# Patient Record
Sex: MALE | Race: WHITE | NOT HISPANIC OR LATINO | URBAN - METROPOLITAN AREA
[De-identification: names, ages, dates, MRNs, and addresses within clinical notes are randomized per-mention and may not be internally consistent; named-entity substitution may affect disease eponyms.]

---

## 2022-10-28 ENCOUNTER — OFFICE VISIT (OUTPATIENT)
Dept: URBAN - METROPOLITAN AREA CLINIC 118 | Facility: CLINIC | Age: 72
End: 2022-10-28
Payer: COMMERCIAL

## 2022-10-28 ENCOUNTER — LAB OUTSIDE AN ENCOUNTER (OUTPATIENT)
Dept: URBAN - METROPOLITAN AREA CLINIC 118 | Facility: CLINIC | Age: 72
End: 2022-10-28

## 2022-10-28 VITALS
HEART RATE: 67 BPM | TEMPERATURE: 98.1 F | DIASTOLIC BLOOD PRESSURE: 82 MMHG | SYSTOLIC BLOOD PRESSURE: 162 MMHG | HEIGHT: 70 IN | WEIGHT: 193 LBS | BODY MASS INDEX: 27.63 KG/M2

## 2022-10-28 DIAGNOSIS — M54.9 RIGHT-SIDED BACK PAIN, UNSPECIFIED BACK LOCATION, UNSPECIFIED CHRONICITY: ICD-10-CM

## 2022-10-28 DIAGNOSIS — R63.4 WEIGHT LOSS: ICD-10-CM

## 2022-10-28 DIAGNOSIS — Z12.11 SCREENING FOR COLON CANCER: ICD-10-CM

## 2022-10-28 PROCEDURE — 99204 OFFICE O/P NEW MOD 45 MIN: CPT | Performed by: INTERNAL MEDICINE

## 2022-10-28 NOTE — PHYSICAL EXAM PSYCH:
normal mood with appropriate affect
Pupils equal, round and reactive to light, Extra-ocular movement intact, eyes are clear b/l

## 2022-10-28 NOTE — HPI-TODAY'S VISIT:
Mr. Garcia is a 71 y/o WM who presents today for colonoscopy consultation.  His last colonoscopy was in 2015 with polyps removed per pt. He reports family history of colon cancer in his mother.  Pt reports occasional right sided back pain after sleeping on his right side. It was occur 2-3 days a week and typically goes away on its own. He denies any changes in bowel habits. He denies GI bleeding or NV. He reports his weight has been fluctuating recently depending on how much he eats or his daily activities. Reports his lowest weight was 182, now 193 today. He has hx of reflux, currently controlled for the most part on PPI. He had EGD with dilation in 2017.

## 2022-10-31 ENCOUNTER — TELEPHONE ENCOUNTER (OUTPATIENT)
Dept: URBAN - METROPOLITAN AREA CLINIC 118 | Facility: CLINIC | Age: 72
End: 2022-10-31

## 2022-11-02 ENCOUNTER — WEB ENCOUNTER (OUTPATIENT)
Dept: URBAN - METROPOLITAN AREA SURGERY CENTER 23 | Facility: SURGERY CENTER | Age: 72
End: 2022-11-02

## 2022-11-03 ENCOUNTER — OFFICE VISIT (OUTPATIENT)
Dept: URBAN - METROPOLITAN AREA SURGERY CENTER 23 | Facility: SURGERY CENTER | Age: 72
End: 2022-11-03
Payer: COMMERCIAL

## 2022-11-03 DIAGNOSIS — Z86.010 ADENOMAS PERSONAL HISTORY OF COLONIC POLYPS: ICD-10-CM

## 2022-11-03 PROCEDURE — G8907 PT DOC NO EVENTS ON DISCHARG: HCPCS | Performed by: INTERNAL MEDICINE

## 2022-11-03 PROCEDURE — G0105 COLORECTAL SCRN; HI RISK IND: HCPCS | Performed by: INTERNAL MEDICINE

## 2023-02-21 ENCOUNTER — OFFICE VISIT (OUTPATIENT)
Dept: URBAN - METROPOLITAN AREA CLINIC 118 | Facility: CLINIC | Age: 73
End: 2023-02-21
Payer: COMMERCIAL

## 2023-02-21 VITALS
SYSTOLIC BLOOD PRESSURE: 132 MMHG | HEIGHT: 70 IN | HEART RATE: 69 BPM | TEMPERATURE: 98.1 F | DIASTOLIC BLOOD PRESSURE: 73 MMHG | BODY MASS INDEX: 27.92 KG/M2 | WEIGHT: 195 LBS

## 2023-02-21 DIAGNOSIS — R14.3 FLATULENCE: ICD-10-CM

## 2023-02-21 DIAGNOSIS — R19.4 CHANGE IN BOWEL HABITS: ICD-10-CM

## 2023-02-21 PROCEDURE — 99213 OFFICE O/P EST LOW 20 MIN: CPT

## 2023-02-21 NOTE — HPI-TODAY'S VISIT:
Mr. Garcia is a 74 y/o WM who presents today for evaluation of change in bowel habits.  Patient currently having 1-2 BM/ day, but reports more urgency after eating and previously was only having 1 BM per day. Reports that about once per month he will have an "accident" where he has an episode of diarrhea.  He reports gas symptoms daily, not dependent on what he eats.  Currently on Pantoprazole daily which controls reflux/ heartburn. Hx of dysphagia, s/p dilation in 2019.  Denies any GI bleeding, unintentional weight loss, NV or abdominal pain. Last colonoscopy was 11/3/22 which showed diverticulosis and internal hemorrhoids. Recommended to repeat in 5 years due to hx of polyps.

## 2023-03-01 LAB
FECAL FAT, QUALITATIVE: (no result)
HELICOBACTER PYLORI AG, EIA, STOOL: (no result)

## 2023-03-02 ENCOUNTER — TELEPHONE ENCOUNTER (OUTPATIENT)
Dept: URBAN - METROPOLITAN AREA CLINIC 118 | Facility: CLINIC | Age: 73
End: 2023-03-02

## 2023-03-05 ENCOUNTER — WEB ENCOUNTER (OUTPATIENT)
Dept: URBAN - METROPOLITAN AREA CLINIC 118 | Facility: CLINIC | Age: 73
End: 2023-03-05

## 2023-03-14 ENCOUNTER — WEB ENCOUNTER (OUTPATIENT)
Dept: URBAN - METROPOLITAN AREA CLINIC 118 | Facility: CLINIC | Age: 73
End: 2023-03-14

## 2023-03-23 ENCOUNTER — TELEPHONE ENCOUNTER (OUTPATIENT)
Dept: URBAN - METROPOLITAN AREA CLINIC 118 | Facility: CLINIC | Age: 73
End: 2023-03-23

## 2023-03-23 RX ORDER — PANCRELIPASE 36000; 180000; 114000 [USP'U]/1; [USP'U]/1; [USP'U]/1
1-2 CAPSULES EACH DAY WITH EVERY MEAL AND 1 WITH SNACKS CAPSULE, DELAYED RELEASE PELLETS ORAL
Qty: 280 | Refills: 3 | OUTPATIENT
Start: 2023-03-24 | End: 2023-07-22

## 2023-05-17 ENCOUNTER — TELEPHONE ENCOUNTER (OUTPATIENT)
Dept: URBAN - METROPOLITAN AREA CLINIC 118 | Facility: CLINIC | Age: 73
End: 2023-05-17

## 2023-05-17 RX ORDER — PANTOPRAZOLE SODIUM 40 MG/1
1 TABLET TABLET, DELAYED RELEASE ORAL ONCE A DAY
Qty: 90 TABLET | Refills: 1

## 2023-05-30 ENCOUNTER — LAB OUTSIDE AN ENCOUNTER (OUTPATIENT)
Dept: URBAN - METROPOLITAN AREA CLINIC 118 | Facility: CLINIC | Age: 73
End: 2023-05-30

## 2023-05-30 ENCOUNTER — WEB ENCOUNTER (OUTPATIENT)
Dept: URBAN - METROPOLITAN AREA CLINIC 118 | Facility: CLINIC | Age: 73
End: 2023-05-30

## 2023-05-30 ENCOUNTER — DASHBOARD ENCOUNTERS (OUTPATIENT)
Age: 73
End: 2023-05-30

## 2023-05-30 ENCOUNTER — OFFICE VISIT (OUTPATIENT)
Dept: URBAN - METROPOLITAN AREA CLINIC 118 | Facility: CLINIC | Age: 73
End: 2023-05-30
Payer: COMMERCIAL

## 2023-05-30 VITALS
SYSTOLIC BLOOD PRESSURE: 171 MMHG | DIASTOLIC BLOOD PRESSURE: 80 MMHG | TEMPERATURE: 97.7 F | BODY MASS INDEX: 27.49 KG/M2 | WEIGHT: 192 LBS | HEIGHT: 70 IN | HEART RATE: 62 BPM

## 2023-05-30 DIAGNOSIS — K90.9 STEATORRHEA: ICD-10-CM

## 2023-05-30 DIAGNOSIS — R63.4 ABNORMAL WEIGHT LOSS: ICD-10-CM

## 2023-05-30 PROBLEM — 267024001: Status: ACTIVE | Noted: 2023-05-30

## 2023-05-30 PROBLEM — 66187002: Status: ACTIVE | Noted: 2023-05-30

## 2023-05-30 PROCEDURE — 99213 OFFICE O/P EST LOW 20 MIN: CPT | Performed by: INTERNAL MEDICINE

## 2023-05-30 RX ORDER — PANTOPRAZOLE SODIUM 40 MG/1
1 TABLET TABLET, DELAYED RELEASE ORAL ONCE A DAY
Qty: 90 TABLET | Refills: 1 | Status: ACTIVE | COMMUNITY

## 2023-05-30 RX ORDER — PANCRELIPASE 36000; 180000; 114000 [USP'U]/1; [USP'U]/1; [USP'U]/1
1-2 CAPSULES EACH DAY WITH EVERY MEAL AND 1 WITH SNACKS CAPSULE, DELAYED RELEASE PELLETS ORAL
Qty: 280 | Refills: 3 | Status: ACTIVE | COMMUNITY
Start: 2023-03-24 | End: 2023-07-22

## 2023-05-30 NOTE — HPI-TODAY'S VISIT:
5/30/23 - Pt here for follow up.  Stool studies positive for fecal fat.  Was given Creon, and is taking 6 pills/d.   BMs now qd, and sometimes constipated.  Still has flatulence.  Has lost 3# since the last visit.  No abd pain, N/V, GI bleed.  Pt's aunt had pancreatic cancer in the 1980s. ------------------------------------------------------------------ 2/21/23 - Mr. Garcia is a 72 y/o WM who presents today for evaluation of change in bowel habits.  Patient currently having 1-2 BM/ day, but reports more urgency after eating and previously was only having 1 BM per day. Reports that about once per month he will have an "accident" where he has an episode of diarrhea.  He reports gas symptoms daily, not dependent on what he eats.  Currently on Pantoprazole daily which controls reflux/ heartburn. Hx of dysphagia, s/p dilation in 2019.  Denies any GI bleeding, unintentional weight loss, NV or abdominal pain. Last colonoscopy was 11/3/22 which showed diverticulosis and internal hemorrhoids. Recommended to repeat in 5 years due to hx of polyps.

## 2023-05-30 NOTE — PHYSICAL EXAM CONSTITUTIONAL:
well developed, well nourished , in no acute distress , ambulating without difficulty , normal communication ability Bell's palsy involving R side of face

## 2023-06-28 NOTE — PHYSICAL EXAM CHEST:
no lesions,  no deformities,  no traumatic injuries,  no significant scars are present, no masses present, breathing is unlabored without accessory muscle use, clear to auscultation bilaterally Sanjeev Rutherford(Attending)

## 2023-11-09 ENCOUNTER — ERX REFILL RESPONSE (OUTPATIENT)
Dept: URBAN - METROPOLITAN AREA CLINIC 118 | Facility: CLINIC | Age: 73
End: 2023-11-09

## 2023-11-09 RX ORDER — PANTOPRAZOLE SODIUM 40 MG/1
1 TABLET TABLET, DELAYED RELEASE ORAL ONCE A DAY
Qty: 90 TABLET | Refills: 1 | OUTPATIENT

## 2023-11-09 RX ORDER — PANTOPRAZOLE SODIUM 40 MG/1
TAKE 1 TABLET BY MOUTH EVERY DAY FOR 90 DAYS TABLET, DELAYED RELEASE ORAL
Qty: 90 TABLET | Refills: 1 | OUTPATIENT

## 2024-05-13 ENCOUNTER — TELEPHONE ENCOUNTER (OUTPATIENT)
Dept: URBAN - METROPOLITAN AREA CLINIC 118 | Facility: CLINIC | Age: 74
End: 2024-05-13

## 2024-05-13 RX ORDER — PANTOPRAZOLE SODIUM 40 MG/1
1 TABLET TABLET, DELAYED RELEASE ORAL ONCE A DAY
Qty: 30 TABLET | Refills: 1

## 2024-06-05 ENCOUNTER — OFFICE VISIT (OUTPATIENT)
Dept: URBAN - METROPOLITAN AREA CLINIC 118 | Facility: CLINIC | Age: 74
End: 2024-06-05
Payer: COMMERCIAL

## 2024-06-05 VITALS
TEMPERATURE: 97.8 F | HEIGHT: 68 IN | WEIGHT: 170 LBS | SYSTOLIC BLOOD PRESSURE: 127 MMHG | BODY MASS INDEX: 25.76 KG/M2 | HEART RATE: 63 BPM | DIASTOLIC BLOOD PRESSURE: 62 MMHG

## 2024-06-05 DIAGNOSIS — R63.4 ABNORMAL WEIGHT LOSS: ICD-10-CM

## 2024-06-05 DIAGNOSIS — K21.9 GERD WITHOUT ESOPHAGITIS: ICD-10-CM

## 2024-06-05 DIAGNOSIS — K90.9 STEATORRHEA: ICD-10-CM

## 2024-06-05 PROBLEM — 266435005: Status: ACTIVE | Noted: 2024-06-05

## 2024-06-05 PROCEDURE — 99214 OFFICE O/P EST MOD 30 MIN: CPT | Performed by: INTERNAL MEDICINE

## 2024-06-05 RX ORDER — PANTOPRAZOLE SODIUM 40 MG/1
1 TABLET TABLET, DELAYED RELEASE ORAL ONCE A DAY
Qty: 90 | Refills: 3

## 2024-06-05 RX ORDER — PANCRELIPASE 36000; 180000; 114000 [USP'U]/1; [USP'U]/1; [USP'U]/1
2 TABS WITH MEALS, 1 TAB WITH SNACKS CAPSULE, DELAYED RELEASE PELLETS ORAL DAILY
Qty: 240 | Refills: 3 | OUTPATIENT
Start: 2024-06-05 | End: 2024-10-03

## 2024-06-05 RX ORDER — GLUCOSAMINE/CHONDR SU A SOD 750-600 MG
AS DIRECTED TABLET ORAL
Status: ACTIVE | COMMUNITY

## 2024-06-05 RX ORDER — ANTIARTHRITIC COMBINATION NO.2 900 MG
1 TABLET TABLET ORAL ONCE A DAY
Status: ACTIVE | COMMUNITY

## 2024-06-05 RX ORDER — CLOBETASOL PROPIONATE 0.5 MG/G
1 APPLICATION AEROSOL, FOAM TOPICAL TWICE A DAY
Status: ACTIVE | COMMUNITY

## 2024-06-05 RX ORDER — ATORVASTATIN CALCIUM 20 MG/1
TABLET, FILM COATED ORAL
Qty: 90 TABLET | Status: ACTIVE | COMMUNITY

## 2024-06-05 RX ORDER — LOSARTAN POTASSIUM 50 MG/1
TABLET, FILM COATED ORAL
Qty: 90 TABLET | Status: ACTIVE | COMMUNITY

## 2024-06-05 RX ORDER — LATANOPROST 50 UG/ML
INSTILL 1 DROP INTO BOTH EYES AT NIGHT SOLUTION/ DROPS OPHTHALMIC
Qty: 7.5 MILLILITER | Refills: 0 | Status: ACTIVE | COMMUNITY

## 2024-06-05 RX ORDER — BUSPIRONE HYDROCHLORIDE 5 MG/1
TAKE 1 TABLET BY MOUTH TWICE A DAY FOR 90 DAYS TABLET ORAL
Qty: 60 EACH | Refills: 0 | Status: ACTIVE | COMMUNITY

## 2024-06-05 RX ORDER — METFORMIN HCL 500 MG/1
TABLET ORAL
Qty: 180 TABLET | Status: ACTIVE | COMMUNITY

## 2024-06-05 RX ORDER — PANTOPRAZOLE SODIUM 40 MG/1
1 TABLET TABLET, DELAYED RELEASE ORAL ONCE A DAY
Qty: 30 TABLET | Refills: 1 | Status: ACTIVE | COMMUNITY

## 2024-06-05 RX ORDER — DONEPEZIL HYDROCHLORIDE 10 MG/1
TABLET, FILM COATED ORAL
Qty: 90 TABLET | Status: ACTIVE | COMMUNITY

## 2024-06-05 RX ORDER — FINASTERIDE 5 MG/1
TAKE 1 TABLET BY MOUTH EVERY DAY TABLET, FILM COATED ORAL
Qty: 90 EACH | Refills: 3 | Status: ACTIVE | COMMUNITY

## 2024-06-05 RX ORDER — CLOBETASOL PROPIONATE 0.5 MG/G
1 APPLICATION OINTMENT TOPICAL TWICE A DAY
Status: ACTIVE | COMMUNITY

## 2024-06-05 RX ORDER — PHENOL 1.4 %
AS DIRECTED AEROSOL, SPRAY (ML) MUCOUS MEMBRANE
Status: ACTIVE | COMMUNITY

## 2024-06-05 RX ORDER — UBIDECARENONE 100 MG
AS DIRECTED CAPSULE ORAL
Status: ACTIVE | COMMUNITY

## 2024-06-05 RX ORDER — TAMSULOSIN HYDROCHLORIDE 0.4 MG/1
TAKE 1 CAPSULE BY MOUTH EVERY DAY 1/2 HOUR FOLLOWING DINNER CAPSULE ORAL
Qty: 90 EACH | Refills: 2 | Status: ACTIVE | COMMUNITY

## 2024-06-05 NOTE — HPI-TODAY'S VISIT:
6/5/24 - 75 yo WM here for follow up.  He came off Creon last summer since he thought he was finished with script.  BMs are irregular, from qod to 3x/d, loose at times.  Malodorous flatus.  Pt has lost 22# since last visit.  CT in 7/2023 was normal.  No abd pain, N/V.  Good appetite. ---------------- 5/30/23 - Pt here for follow up.  Stool studies positive for fecal fat.  Was given Creon, and is taking 6 pills/d.   BMs now qd, and sometimes constipated.  Still has flatulence.  Has lost 3# since the last visit.  No abd pain, N/V, GI bleed.  Pt's aunt had pancreatic cancer in the 1980s. ------------------------------------------------------------------ 2/21/23 - Mr. Garcia is a 74 y/o WM who presents today for evaluation of change in bowel habits.  Patient currently having 1-2 BM/ day, but reports more urgency after eating and previously was only having 1 BM per day. Reports that about once per month he will have an "accident" where he has an episode of diarrhea.  He reports gas symptoms daily, not dependent on what he eats.  Currently on Pantoprazole daily which controls reflux/ heartburn. Hx of dysphagia, s/p dilation in 2019.  Denies any GI bleeding, unintentional weight loss, NV or abdominal pain. Last colonoscopy was 11/3/22 which showed diverticulosis and internal hemorrhoids. Recommended to repeat in 5 years due to hx of polyps.

## 2024-06-23 LAB
FECAL FAT, QUALITATIVE: NORMAL
PANCREATIC ELASTASE, FECAL: 398

## 2024-08-16 ENCOUNTER — OFFICE VISIT (OUTPATIENT)
Dept: URBAN - METROPOLITAN AREA CLINIC 118 | Facility: CLINIC | Age: 74
End: 2024-08-16
Payer: COMMERCIAL

## 2024-08-16 ENCOUNTER — LAB OUTSIDE AN ENCOUNTER (OUTPATIENT)
Dept: URBAN - METROPOLITAN AREA CLINIC 118 | Facility: CLINIC | Age: 74
End: 2024-08-16

## 2024-08-16 VITALS
BODY MASS INDEX: 26.93 KG/M2 | DIASTOLIC BLOOD PRESSURE: 72 MMHG | SYSTOLIC BLOOD PRESSURE: 146 MMHG | WEIGHT: 177.66 LBS | HEART RATE: 64 BPM | TEMPERATURE: 97.5 F | HEIGHT: 68 IN

## 2024-08-16 DIAGNOSIS — R63.4 ABNORMAL WEIGHT LOSS: ICD-10-CM

## 2024-08-16 DIAGNOSIS — R13.19 DYSPHAGIA: ICD-10-CM

## 2024-08-16 DIAGNOSIS — K21.9 GERD WITHOUT ESOPHAGITIS: ICD-10-CM

## 2024-08-16 DIAGNOSIS — K90.9 STEATORRHEA: ICD-10-CM

## 2024-08-16 PROBLEM — 40739000: Status: ACTIVE | Noted: 2024-08-16

## 2024-08-16 PROCEDURE — 99214 OFFICE O/P EST MOD 30 MIN: CPT

## 2024-08-16 RX ORDER — MULTIVIT-MIN/FOLIC/VIT K/LYCOP 400-300MCG
AS DIRECTED TABLET ORAL
Status: ACTIVE | COMMUNITY

## 2024-08-16 RX ORDER — ATORVASTATIN CALCIUM 20 MG/1
TABLET, FILM COATED ORAL
Qty: 90 TABLET | Status: ACTIVE | COMMUNITY

## 2024-08-16 RX ORDER — FINASTERIDE 5 MG/1
TAKE 1 TABLET BY MOUTH EVERY DAY TABLET, FILM COATED ORAL
Qty: 90 EACH | Refills: 3 | Status: ACTIVE | COMMUNITY

## 2024-08-16 RX ORDER — PANTOPRAZOLE SODIUM 40 MG/1
1 TABLET TABLET, DELAYED RELEASE ORAL ONCE A DAY
Qty: 90 | Refills: 3 | Status: ACTIVE | COMMUNITY

## 2024-08-16 RX ORDER — GLUCOSAMINE/CHONDR SU A SOD 750-600 MG
AS DIRECTED TABLET ORAL
Status: ACTIVE | COMMUNITY

## 2024-08-16 RX ORDER — CLOBETASOL PROPIONATE 0.5 MG/G
1 APPLICATION OINTMENT TOPICAL TWICE A DAY
Status: ACTIVE | COMMUNITY

## 2024-08-16 RX ORDER — CLOBETASOL PROPIONATE 0.5 MG/G
1 APPLICATION AEROSOL, FOAM TOPICAL TWICE A DAY
Status: ACTIVE | COMMUNITY

## 2024-08-16 RX ORDER — ANTIARTHRITIC COMBINATION NO.2 900 MG
1 TABLET TABLET ORAL ONCE A DAY
Status: ACTIVE | COMMUNITY

## 2024-08-16 RX ORDER — DONEPEZIL HYDROCHLORIDE 10 MG/1
TABLET, FILM COATED ORAL
Qty: 90 TABLET | Status: ACTIVE | COMMUNITY

## 2024-08-16 RX ORDER — LATANOPROST 50 UG/ML
INSTILL 1 DROP INTO BOTH EYES AT NIGHT SOLUTION/ DROPS OPHTHALMIC
Qty: 7.5 MILLILITER | Refills: 0 | Status: ACTIVE | COMMUNITY

## 2024-08-16 RX ORDER — UBIDECARENONE 100 MG
AS DIRECTED CAPSULE ORAL
Status: ACTIVE | COMMUNITY

## 2024-08-16 RX ORDER — METFORMIN HCL 500 MG/1
TABLET ORAL
Qty: 180 TABLET | Status: ACTIVE | COMMUNITY

## 2024-08-16 RX ORDER — PANCRELIPASE 36000; 180000; 114000 [USP'U]/1; [USP'U]/1; [USP'U]/1
2 TABS WITH MEALS, 1 TAB WITH SNACKS CAPSULE, DELAYED RELEASE PELLETS ORAL DAILY
Qty: 240 | Refills: 3 | Status: ACTIVE | COMMUNITY
Start: 2024-06-05 | End: 2024-10-03

## 2024-08-16 RX ORDER — LOSARTAN POTASSIUM 50 MG/1
TABLET, FILM COATED ORAL
Qty: 90 TABLET | Status: ACTIVE | COMMUNITY

## 2024-08-16 RX ORDER — BUSPIRONE HYDROCHLORIDE 5 MG/1
TAKE 1 TABLET BY MOUTH TWICE A DAY FOR 90 DAYS TABLET ORAL
Qty: 60 EACH | Refills: 0 | Status: ACTIVE | COMMUNITY

## 2024-08-16 RX ORDER — TAMSULOSIN HYDROCHLORIDE 0.4 MG/1
TAKE 1 CAPSULE BY MOUTH EVERY DAY 1/2 HOUR FOLLOWING DINNER CAPSULE ORAL
Qty: 90 EACH | Refills: 2 | Status: ACTIVE | COMMUNITY

## 2024-08-16 NOTE — HPI-TODAY'S VISIT:
Patient is a 75 y/o M who presents for f/u. Reports Creon is working really well. Taking 4-6 pills/day depending on how many meals he eats. Formed BM 2-4x/day. Occasional bouts of diarrhea- not loose stools, just increase in frequency. Last episode was yesterday, reports having urodynamics testing the day before. Denies abd pain, hematochezia, melena, unintentional wt loss. Reports good appetite. 7# wt gain since last visit. FHx+ for mother with colon CA in late 70s/early 80s. Up-to-date with colon screening (due in 11/2027). Patient also reports dysphagia and "choking on liquids and solids" x 3-4 months. Happens at least once every 2-3 weeks. Symptoms worse/more frequent in last 2 weeks. Reports he will have to tell someone to hit his back. On pantoprazole daily. Denies reflux, N/V, other UGI sx. Patient has hx of EGD w/ dilation, last one in 2019. -------------------------- 6/5/24 - 75 yo WM here for follow up.  He came off [employer] last summer since he thought he was finished with script.  BMs are irregular, from qod to 3x/d, loose at times.  Malodorous flatus.  Pt has lost 22# since last visit.  CT in 7/2023 was normal.  No abd pain, N/V.  Good appetite. ---------------- 5/30/23 - Pt here for follow up.  Stool studies positive for fecal fat.  Was given Creon, and is taking 6 pills/d.   BMs now qd, and sometimes constipated.  Still has flatulence.  Has lost 3# since the last visit.  No abd pain, N/V, GI bleed.  Pt's aunt had pancreatic cancer in the 1980s. ------------------------------------------------------------------ 2/21/23 - Mr. Garcia is a 74 y/o WM who presents today for evaluation of change in bowel habits.  Patient currently having 1-2 BM/ day, but reports more urgency after eating and previously was only having 1 BM per day. Reports that about once per month he will have an "accident" where he has an episode of diarrhea.  He reports gas symptoms daily, not dependent on what he eats.  Currently on Pantoprazole daily which controls reflux/ heartburn. Hx of dysphagia, s/p dilation in 2019.  Denies any GI bleeding, unintentional weight loss, NV or abdominal pain. Last colonoscopy was 11/3/22 which showed diverticulosis and internal hemorrhoids. Recommended to repeat in 5 years due to hx of polyps.

## 2024-09-10 ENCOUNTER — OFFICE VISIT (OUTPATIENT)
Dept: URBAN - METROPOLITAN AREA SURGERY CENTER 23 | Facility: SURGERY CENTER | Age: 74
End: 2024-09-10
Payer: COMMERCIAL

## 2024-09-10 ENCOUNTER — LAB OUTSIDE AN ENCOUNTER (OUTPATIENT)
Dept: URBAN - METROPOLITAN AREA SURGERY CENTER 23 | Facility: SURGERY CENTER | Age: 74
End: 2024-09-10

## 2024-09-10 DIAGNOSIS — R13.10 DYSPHAGIA: ICD-10-CM

## 2024-09-10 DIAGNOSIS — R13.19 OTHER DYSPHAGIA: ICD-10-CM

## 2024-09-10 DIAGNOSIS — K22.89 OTHER SPECIFIED DISEASE OF ESOPHAGUS: ICD-10-CM

## 2024-09-10 PROBLEM — 71457002: Status: ACTIVE | Noted: 2024-09-10

## 2024-09-10 PROCEDURE — 43249 ESOPH EGD DILATION <30 MM: CPT | Performed by: INTERNAL MEDICINE

## 2024-09-10 PROCEDURE — 00731 ANES UPR GI NDSC PX NOS: CPT | Performed by: NURSE ANESTHETIST, CERTIFIED REGISTERED

## 2024-09-10 RX ORDER — PANCRELIPASE 36000; 180000; 114000 [USP'U]/1; [USP'U]/1; [USP'U]/1
2 TABS WITH MEALS, 1 TAB WITH SNACKS CAPSULE, DELAYED RELEASE PELLETS ORAL DAILY
Qty: 240 | Refills: 3 | Status: ACTIVE | COMMUNITY
Start: 2024-06-05 | End: 2024-10-03

## 2024-09-10 RX ORDER — UBIDECARENONE 100 MG
AS DIRECTED CAPSULE ORAL
Status: ACTIVE | COMMUNITY

## 2024-09-10 RX ORDER — FINASTERIDE 5 MG/1
TAKE 1 TABLET BY MOUTH EVERY DAY TABLET, FILM COATED ORAL
Qty: 90 EACH | Refills: 3 | Status: ACTIVE | COMMUNITY

## 2024-09-10 RX ORDER — METFORMIN HCL 500 MG/1
TABLET ORAL
Qty: 180 TABLET | Status: ACTIVE | COMMUNITY

## 2024-09-10 RX ORDER — TAMSULOSIN HYDROCHLORIDE 0.4 MG/1
TAKE 1 CAPSULE BY MOUTH EVERY DAY 1/2 HOUR FOLLOWING DINNER CAPSULE ORAL
Qty: 90 EACH | Refills: 2 | Status: ACTIVE | COMMUNITY

## 2024-09-10 RX ORDER — CLOBETASOL PROPIONATE 0.5 MG/G
1 APPLICATION AEROSOL, FOAM TOPICAL TWICE A DAY
Status: ACTIVE | COMMUNITY

## 2024-09-10 RX ORDER — ATORVASTATIN CALCIUM 20 MG/1
TABLET, FILM COATED ORAL
Qty: 90 TABLET | Status: ACTIVE | COMMUNITY

## 2024-09-10 RX ORDER — LOSARTAN POTASSIUM 50 MG/1
TABLET, FILM COATED ORAL
Qty: 90 TABLET | Status: ACTIVE | COMMUNITY

## 2024-09-10 RX ORDER — LATANOPROST 50 UG/ML
INSTILL 1 DROP INTO BOTH EYES AT NIGHT SOLUTION/ DROPS OPHTHALMIC
Qty: 7.5 MILLILITER | Refills: 0 | Status: ACTIVE | COMMUNITY

## 2024-09-10 RX ORDER — DONEPEZIL HYDROCHLORIDE 10 MG/1
TABLET, FILM COATED ORAL
Qty: 90 TABLET | Status: ACTIVE | COMMUNITY

## 2024-09-10 RX ORDER — MULTIVIT-MIN/FOLIC/VIT K/LYCOP 400-300MCG
AS DIRECTED TABLET ORAL
Status: ACTIVE | COMMUNITY

## 2024-09-10 RX ORDER — GLUCOSAMINE/CHONDR SU A SOD 750-600 MG
AS DIRECTED TABLET ORAL
Status: ACTIVE | COMMUNITY

## 2024-09-10 RX ORDER — CLOBETASOL PROPIONATE 0.5 MG/G
1 APPLICATION OINTMENT TOPICAL TWICE A DAY
Status: ACTIVE | COMMUNITY

## 2024-09-10 RX ORDER — BUSPIRONE HYDROCHLORIDE 5 MG/1
TAKE 1 TABLET BY MOUTH TWICE A DAY FOR 90 DAYS TABLET ORAL
Qty: 60 EACH | Refills: 0 | Status: ACTIVE | COMMUNITY

## 2024-09-10 RX ORDER — ANTIARTHRITIC COMBINATION NO.2 900 MG
1 TABLET TABLET ORAL ONCE A DAY
Status: ACTIVE | COMMUNITY

## 2024-09-10 RX ORDER — PANTOPRAZOLE SODIUM 40 MG/1
1 TABLET TABLET, DELAYED RELEASE ORAL ONCE A DAY
Qty: 90 | Refills: 3 | Status: ACTIVE | COMMUNITY

## 2024-09-12 ENCOUNTER — P2P PATIENT RECORD (OUTPATIENT)
Age: 74
End: 2024-09-12

## 2024-09-20 ENCOUNTER — ERX REFILL RESPONSE (OUTPATIENT)
Dept: URBAN - METROPOLITAN AREA CLINIC 118 | Facility: CLINIC | Age: 74
End: 2024-09-20

## 2024-09-20 RX ORDER — PANTOPRAZOLE SODIUM 40 MG/1
1 TABLET TABLET, DELAYED RELEASE ORAL ONCE A DAY
Qty: 90 | Refills: 3 | OUTPATIENT

## 2024-09-23 ENCOUNTER — WEB ENCOUNTER (OUTPATIENT)
Dept: URBAN - METROPOLITAN AREA CLINIC 118 | Facility: CLINIC | Age: 74
End: 2024-09-23

## 2025-01-23 ENCOUNTER — WEB ENCOUNTER (OUTPATIENT)
Dept: URBAN - METROPOLITAN AREA CLINIC 118 | Facility: CLINIC | Age: 75
End: 2025-01-23

## 2025-01-23 ENCOUNTER — ERX REFILL RESPONSE (OUTPATIENT)
Dept: URBAN - METROPOLITAN AREA CLINIC 118 | Facility: CLINIC | Age: 75
End: 2025-01-23

## 2025-01-23 RX ORDER — PANCRELIPASE 36000; 180000; 114000 [USP'U]/1; [USP'U]/1; [USP'U]/1
2 TABS WITH MEALS, 1 TAB WITH SNACKS CAPSULE, DELAYED RELEASE PELLETS ORAL DAILY
Qty: 240 | Refills: 3 | OUTPATIENT

## 2025-01-23 RX ORDER — PANCRELIPASE 36000; 180000; 114000 [USP'U]/1; [USP'U]/1; [USP'U]/1
2 TABS WITH MEALS, 1 TAB WITH SNACKS CAPSULE, DELAYED RELEASE PELLETS ORAL DAILY
Qty: 240 | Refills: 3
Start: 2024-06-05 | End: 2025-05-23

## 2025-01-23 RX ORDER — PANCRELIPASE 36000; 180000; 114000 [USP'U]/1; [USP'U]/1; [USP'U]/1
2 TABS WITH MEALS, 1 TAB WITH SNACKS ORALLY DAILY 30 DAYS CAPSULE, DELAYED RELEASE PELLETS ORAL
Qty: 300 CAPSULE | Refills: 3 | OUTPATIENT